# Patient Record
Sex: MALE | Race: BLACK OR AFRICAN AMERICAN | Employment: UNEMPLOYED | URBAN - METROPOLITAN AREA
[De-identification: names, ages, dates, MRNs, and addresses within clinical notes are randomized per-mention and may not be internally consistent; named-entity substitution may affect disease eponyms.]

---

## 2021-12-05 ENCOUNTER — HOSPITAL ENCOUNTER (EMERGENCY)
Age: 31
Discharge: HOME | End: 2021-12-05
Attending: EMERGENCY MEDICINE

## 2021-12-05 VITALS
HEART RATE: 82 BPM | OXYGEN SATURATION: 100 % | TEMPERATURE: 98.7 F | DIASTOLIC BLOOD PRESSURE: 84 MMHG | RESPIRATION RATE: 15 BRPM | SYSTOLIC BLOOD PRESSURE: 165 MMHG

## 2021-12-05 DIAGNOSIS — M54.31 RIGHT SIDED SCIATICA: Primary | ICD-10-CM

## 2021-12-05 PROCEDURE — 99281 EMR DPT VST MAYX REQ PHY/QHP: CPT

## 2021-12-05 RX ORDER — IBUPROFEN 800 MG/1
800 TABLET ORAL EVERY 8 HOURS
Qty: 9 TABLET | Refills: 0 | Status: SHIPPED | OUTPATIENT
Start: 2021-12-05 | End: 2021-12-08

## 2021-12-05 RX ORDER — OXYCODONE AND ACETAMINOPHEN 5; 325 MG/1; MG/1
1 TABLET ORAL
Qty: 8 TABLET | Refills: 0 | Status: SHIPPED | OUTPATIENT
Start: 2021-12-05 | End: 2021-12-08

## 2021-12-06 NOTE — ED TRIAGE NOTES
Patient complaining of low back pain since playing football a \"few days ago\". Patient states he has a hx of sciatica. Attempted relief with prednisone without success. Denies bowel or bladder issues. Ambulates with steady gait.

## 2021-12-06 NOTE — ED PROVIDER NOTES
EMERGENCY DEPARTMENT HISTORY AND PHYSICAL EXAM    9:37 PM patient seen at this time in super track room      Date: 12/5/2021  Patient Name: Charmaine Fuentes    History of Presenting Illness     Chief Complaint   Patient presents with    Back Pain         History Provided By: patient    Additional History (Context): Charmaine Fuentes is a 27 y.o. male presents with *last week playing football now has recurrent right-sided sciatica symptoms he is supposed to see an orthopedist but has not. Rates his pain is severe worse with movement. No bowel bladder incontinence saddle anesthesia weakness or numbness in the lower extremities. He has tried some gabapentin, a complete Medrol Dosepak which she is used in the past, a few Percocet, and as needed ibuprofen. No high-energy trauma. PCP: Lola, Not On File, [place]    Chief Complaint:   Duration:    Timing:    Location:   Quality:   Severity:   Modifying Factors:   Associated Symptoms:       Current Outpatient Medications   Medication Sig Dispense Refill    oxyCODONE-acetaminophen (Percocet) 5-325 mg per tablet Take 1 Tablet by mouth every four (4) hours as needed for Pain for up to 3 days. Max Daily Amount: 6 Tablets. 8 Tablet 0    ibuprofen (MOTRIN) 800 mg tablet Take 1 Tablet by mouth every eight (8) hours for 3 days. 9 Tablet 0       Past History     Past Medical History:  No past medical history on file. Past Surgical History:  No past surgical history on file. Family History:  No family history on file. Social History:  Social History     Tobacco Use    Smoking status: Not on file    Smokeless tobacco: Not on file   Substance Use Topics    Alcohol use: Not on file    Drug use: Not on file       Allergies:  No Known Allergies      Review of Systems     Review of Systems   Constitutional: Negative for diaphoresis and fever. HENT: Negative for congestion and sore throat. Eyes: Negative for pain and itching.    Respiratory: Negative for cough and shortness of breath. Cardiovascular: Negative for chest pain and palpitations. Gastrointestinal: Negative for abdominal pain and diarrhea. Endocrine: Negative for polydipsia and polyuria. Genitourinary: Negative for dysuria and hematuria. Musculoskeletal: Negative for arthralgias and myalgias. Skin: Negative for rash and wound. Neurological: Negative for seizures and syncope. Hematological: Does not bruise/bleed easily. Psychiatric/Behavioral: Negative for agitation and hallucinations. Physical Exam       Patient Vitals for the past 12 hrs:   Temp Pulse Resp BP SpO2   12/05/21 1931 98.7 °F (37.1 °C) 82 15 (!) 165/84 100 %       IPVITALS  Patient Vitals for the past 24 hrs:   BP Temp Pulse Resp SpO2   12/05/21 1931 (!) 165/84 98.7 °F (37.1 °C) 82 15 100 %       Physical Exam  Vitals and nursing note reviewed. Constitutional:       General: He is in acute distress (Mild to moderate, favors the right side). Appearance: He is well-developed. HENT:      Head: Normocephalic and atraumatic. Eyes:      General: No scleral icterus. Conjunctiva/sclera: Conjunctivae normal.   Neck:      Vascular: No JVD. Cardiovascular:      Rate and Rhythm: Normal rate and regular rhythm. Pulmonary:      Effort: Pulmonary effort is normal. No respiratory distress. Musculoskeletal:         General: Normal range of motion. Cervical back: Normal range of motion and neck supple. Comments: No specific midline tenderness or paraspinal tenderness he does have right-sided SI joint tenderness which is significant. No deformities. Skin:     General: Skin is warm and dry. Neurological:      Mental Status: He is alert. Comments: Lower extremities neurologically intact vascular intact   Psychiatric:         Thought Content:  Thought content normal.         Judgment: Judgment normal.           Diagnostic Study Results   Labs -  No results found for this or any previous visit (from the past 12 hour(s)). Radiologic Studies -   No orders to display     No results found. Medications ordered:   Medications - No data to display      Medical Decision Making   Initial Medical Decision Making and DDx: In summary: Consistent with alarming spinal pathology fracture abscess hematoma cord impingement. Treat for sciatica he is already tried a course of steroids will do scheduled ibuprofen for 3 days short course of Percocet no spastic character so will not prescribe benzos or Flexeril. Follow-up with Dr. Hussein Said. He is happy with the plan. ED Course: Progress Notes, Reevaluation, and Consults:         I am the first provider for this patient. I reviewed the vital signs, available nursing notes, past medical history, past surgical history, family history and social history. Patient Vitals for the past 12 hrs:   Temp Pulse Resp BP SpO2   12/05/21 1931 98.7 °F (37.1 °C) 82 15 (!) 165/84 100 %       Vital Signs-Reviewed the patient's vital signs. Pulse Oximetry Analysis, Cardiac Monitor, 12 lead ekg:      Interpreted by the EP. Records Reviewed: Nursing notes reviewed (Time of Review: 9:37 PM)    Procedures:   Critical Care Time:   Aspirin: (was aspirin given for stroke?)    Diagnosis     Clinical Impression:   1. Right sided sciatica        Disposition: Discharged      Follow-up Information     Follow up With Specialties Details Why Contact Info    Colleen Fuchs MD Orthopedic Surgery In 3 days  [address] [facility] [phone]             Patient's Medications   Start Taking    IBUPROFEN (MOTRIN) 800 MG TABLET    Take 1 Tablet by mouth every eight (8) hours for 3 days. OXYCODONE-ACETAMINOPHEN (PERCOCET) 5-325 MG PER TABLET    Take 1 Tablet by mouth every four (4) hours as needed for Pain for up to 3 days. Max Daily Amount: 6 Tablets.    Continue Taking    No medications on file   These Medications have changed    No medications on file   Stop Taking    No medications on file _______________________________    Notes:    Trygve Cory, MD using Dragon dictation      _______________________________

## 2022-01-10 ENCOUNTER — HOSPITAL ENCOUNTER (EMERGENCY)
Age: 32
Discharge: HOME | End: 2022-01-10
Attending: STUDENT IN AN ORGANIZED HEALTH CARE EDUCATION/TRAINING PROGRAM

## 2022-01-10 VITALS
TEMPERATURE: 98.2 F | HEART RATE: 79 BPM | SYSTOLIC BLOOD PRESSURE: 157 MMHG | BODY MASS INDEX: 29.62 KG/M2 | RESPIRATION RATE: 16 BRPM | DIASTOLIC BLOOD PRESSURE: 114 MMHG | HEIGHT: 69 IN | WEIGHT: 200 LBS | OXYGEN SATURATION: 99 %

## 2022-01-10 DIAGNOSIS — K02.9 DENTAL CARIES: ICD-10-CM

## 2022-01-10 DIAGNOSIS — K08.89 DENTALGIA: Primary | ICD-10-CM

## 2022-01-10 PROCEDURE — 99281 EMR DPT VST MAYX REQ PHY/QHP: CPT

## 2022-01-10 RX ORDER — PENICILLIN V POTASSIUM 500 MG/1
500 TABLET, FILM COATED ORAL 4 TIMES DAILY
Qty: 28 TABLET | Refills: 0 | Status: SHIPPED | OUTPATIENT
Start: 2022-01-10 | End: 2022-01-17

## 2022-01-10 RX ORDER — NAPROXEN 250 MG/1
500 TABLET ORAL
Status: DISCONTINUED | OUTPATIENT
Start: 2022-01-10 | End: 2022-01-10 | Stop reason: HOSPADM

## 2022-01-10 RX ORDER — NAPROXEN 500 MG/1
500 TABLET ORAL 2 TIMES DAILY WITH MEALS
Qty: 28 TABLET | Refills: 0 | Status: SHIPPED | OUTPATIENT
Start: 2022-01-10 | End: 2022-01-24

## 2022-01-10 RX ORDER — ACETAMINOPHEN 500 MG
1000 TABLET ORAL ONCE
Status: DISCONTINUED | OUTPATIENT
Start: 2022-01-10 | End: 2022-01-10 | Stop reason: HOSPADM

## 2022-01-10 RX ORDER — CHLORHEXIDINE GLUCONATE 1.2 MG/ML
15 RINSE ORAL EVERY 12 HOURS
Qty: 420 ML | Refills: 0 | Status: SHIPPED | OUTPATIENT
Start: 2022-01-10 | End: 2022-01-24

## 2022-01-10 NOTE — ED TRIAGE NOTES
Patient comes in complaining of right sided dental pain on the top and bottom, thinks he cracked something. Patient states that it started bothering him a couple of days ago.

## 2022-01-10 NOTE — ED PROVIDER NOTES
Patient is a 69-year-old male, no relevant medical history, presents for 3 days of dental pain. Patient states that, 3 days ago, he was eating food and felt a crack. Concern that right lower tooth had feeling ripped out. Patient also concerned that tooth has a new crack in it, as he has been having a lot of pain. Patient also has concern for crack in molar and right upper jaw. Patient seen a month ago for dental work after ED visit where patient complained of sciatica and dental pain. Patient states he got a filling in tooth of right lower jaw, and was given opiate level medication. Patient requesting pain medication from [facility] ED this a.m. for evaluation. Patient has been trying over-the-counter analgesia, including ibuprofen to minimal effect. Patient concerned that dentist will take a week to get him into the office. Patient does state \"taking a friends percocet\" for treatment of his symptoms prior to arrival in addition to his significant others toradol. He states symptoms have worsened since onset. He states his symptoms are sever in quality. Patient is fully vaccinated, does not drink, use tobacco, or use drugs. 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 Patient lives with wife and 2 kids. No past medical history on file. No past surgical history on file. No family history on file.     Social History     Socioeconomic History    Marital status:      Spouse name: Not on file    Number of children: Not on file    Years of education: Not on file    Highest education level: Not on file   Occupational History    Not on file   Tobacco Use    Smoking status: Not on file    Smokeless tobacco: Not on file   Substance and Sexual Activity    Alcohol use: Not on file    Drug use: Not on file    Sexual activity: Not on file   Other Topics Concern    Not on file   Social History Narrative    Not on file     Social Determinants of Health     Financial Resource Strain:     Difficulty of Paying Living Expenses: Not on file   Food Insecurity:     Worried About Running Out of Food in the Last Year: Not on file    Nidhi of Food in the Last Year: Not on file   Transportation Needs:     Lack of Transportation (Medical): Not on file    Lack of Transportation (Non-Medical): Not on file   Physical Activity:     Days of Exercise per Week: Not on file    Minutes of Exercise per Session: Not on file   Stress:     Feeling of Stress : Not on file   Social Connections:     Frequency of Communication with Friends and Family: Not on file    Frequency of Social Gatherings with Friends and Family: Not on file    Attends Anabaptism Services: Not on file    Active Member of [address] [place] [address] [place] or Organizations: Not on file    Attends Club or Organization Meetings: Not on file    Marital Status: Not on file   Intimate Partner Violence:     Fear of Current or Ex-Partner: Not on file    Emotionally Abused: Not on file    Physically Abused: Not on file    Sexually Abused: Not on file   Housing Stability:     Unable to Pay for Housing in the Last Year: Not on file    Number of [place] in the Last Year: Not on file    Unstable Housing in the Last Year: Not on file         ALLERGIES: Patient has no known allergies. Review of Systems   Constitutional: Negative. HENT: Positive for dental problem. Negative for congestion, drooling, ear discharge, ear pain, facial swelling, hearing loss, mouth sores, nosebleeds, postnasal drip, rhinorrhea, sinus pressure, sinus pain, sneezing, sore throat, tinnitus, trouble swallowing and voice change. Respiratory: Negative. Cardiovascular: Negative. Gastrointestinal: Negative. Musculoskeletal: Negative. Skin: Negative. Neurological: Negative. Psychiatric/Behavioral: Negative.         Vitals:    01/10/22 0642   BP: (!) 157/114   Pulse: 79   Resp: 16   Temp: 98.2 °F (36.8 °C)   SpO2: 99% Weight: 90.7 kg (200 lb)   Height: 5' 9\" (1.753 m)            Physical Exam  Vitals reviewed. Constitutional:       Appearance: Normal appearance. He is normal weight. HENT:      Head: Normocephalic and atraumatic. Right Ear: Tympanic membrane normal.      Left Ear: Tympanic membrane normal.      Nose: Nose normal.      Mouth/Throat:      Mouth: Mucous membranes are dry. Dentition: Abnormal dentition. Does not have dentures. Dental tenderness and dental caries present. No gingival swelling, dental abscesses or gum lesions. Pharynx: Oropharynx is clear. No oropharyngeal exudate or posterior oropharyngeal erythema. Eyes:      Conjunctiva/sclera: Conjunctivae normal.      Pupils: Pupils are equal, round, and reactive to light. Cardiovascular:      Rate and Rhythm: Normal rate and regular rhythm. Pulses: Normal pulses. Heart sounds: Normal heart sounds. Pulmonary:      Effort: Pulmonary effort is normal.      Breath sounds: Normal breath sounds. Abdominal:      General: Abdomen is flat. Bowel sounds are normal.      Palpations: Abdomen is soft. Musculoskeletal:         General: Normal range of motion. Skin:     General: Skin is warm and dry. Capillary Refill: Capillary refill takes less than 2 seconds. Neurological:      General: No focal deficit present. Mental Status: He is alert and oriented to person, place, and time. Mental status is at baseline. Psychiatric:         Mood and Affect: Mood normal.         Behavior: Behavior normal.         Thought Content: Thought content normal.         Judgment: Judgment normal.          MDM  Number of Diagnoses or Management Options  Dental caries  Dentalgia  Diagnosis management comments: DDX: dentalgia, dental caries, periapical abscess, drug seeking behavior    ED Course as of 01/10/22 0742   Mon Leo 10, 2022   0650 Evaluated in waiting room, AO X4, lucid, ambulatory, no obvious distress.   States has been having dental pain for roughly 3 days. Says he was eating, and he feels like a feeling in his tooth in RL jaw came out and tooth is potentially cracked. Pain since that time, and possible cracked tooth in RU molar. Endorses being seen a month ago for similar symptoms. Previously saw dentist, and is concerned that he needs to go back. Is requesting pain medication. Physical exam shows cracked molar in posterior right upper jaw, possible exposed carry and cracked tooth in right lower jaw, with filling still attached. [JW]   [phone] [id number] Patient requested to speak to physician, endorsing that at previous visit he was also seen for sciatica and had concern that physician would think he was being dishonest.  Patient reassured this was not the case. Patient requesting opiate medication for pain, stating that ibuprofen and Tylenol will not work. [JW]   [phone] with patient explained to him that he would not be discharged home with opiates however could give one-time dose here in the emergency department. Patient states that he did not want to wait for discharge paperwork or medications for treatment of his symptoms. States that he was going to try to call a dentist and have his appointment moved up from Friday to earliest available. Patient discharged at this time. [DV]   0055 Discharged with mouthwash, oral antiseptic, penicillin, advised to take ibuprofen and Tylenol intermittently (not to exceed 800 ibuprofen 3 times daily).   Patient to follow-up with outpatient dentist. . [JW]      ED Course User Index  [DV] Karlene Howard DO  [JW] Nicole Martinez DO